# Patient Record
Sex: FEMALE | Race: WHITE | Employment: FULL TIME | URBAN - METROPOLITAN AREA
[De-identification: names, ages, dates, MRNs, and addresses within clinical notes are randomized per-mention and may not be internally consistent; named-entity substitution may affect disease eponyms.]

---

## 2020-03-24 ENCOUNTER — THERAPY VISIT (OUTPATIENT)
Dept: PHYSICAL THERAPY | Facility: CLINIC | Age: 45
End: 2020-03-24
Payer: OTHER MISCELLANEOUS

## 2020-03-24 DIAGNOSIS — M25.562 ACUTE PAIN OF LEFT KNEE: ICD-10-CM

## 2020-03-24 PROCEDURE — 97110 THERAPEUTIC EXERCISES: CPT | Mod: GP | Performed by: PHYSICAL THERAPIST

## 2020-03-24 PROCEDURE — 97140 MANUAL THERAPY 1/> REGIONS: CPT | Mod: GP | Performed by: PHYSICAL THERAPIST

## 2020-03-24 PROCEDURE — 97161 PT EVAL LOW COMPLEX 20 MIN: CPT | Mod: GP | Performed by: PHYSICAL THERAPIST

## 2020-03-24 ASSESSMENT — ACTIVITIES OF DAILY LIVING (ADL)
AS_A_RESULT_OF_YOUR_KNEE_INJURY,_HOW_WOULD_YOU_RATE_YOUR_CURRENT_LEVEL_OF_DAILY_ACTIVITY?: ABNORMAL
SWELLING: I DO NOT HAVE THE SYMPTOM
GIVING WAY, BUCKLING OR SHIFTING OF KNEE: I DO NOT HAVE THE SYMPTOM
HOW_WOULD_YOU_RATE_THE_OVERALL_FUNCTION_OF_YOUR_KNEE_DURING_YOUR_USUAL_DAILY_ACTIVITIES?: ABNORMAL
RAW_SCORE: 47
GO DOWN STAIRS: ACTIVITY IS VERY DIFFICULT
SIT WITH YOUR KNEE BENT: ACTIVITY IS NOT DIFFICULT
WALK: ACTIVITY IS MINIMALLY DIFFICULT
KNEE_ACTIVITY_OF_DAILY_LIVING_SCORE: 67.14
PAIN: THE SYMPTOM AFFECTS MY ACTIVITY SEVERELY
KNEEL ON THE FRONT OF YOUR KNEE: I AM UNABLE TO DO THE ACTIVITY
WEAKNESS: I DO NOT HAVE THE SYMPTOM
RISE FROM A CHAIR: ACTIVITY IS NOT DIFFICULT
GO UP STAIRS: ACTIVITY IS VERY DIFFICULT
SQUAT: ACTIVITY IS NOT DIFFICULT
STAND: ACTIVITY IS NOT DIFFICULT
LIMPING: THE SYMPTOM AFFECTS MY ACTIVITY MODERATELY
STIFFNESS: THE SYMPTOM AFFECTS MY ACTIVITY SLIGHTLY
KNEE_ACTIVITY_OF_DAILY_LIVING_SUM: 47

## 2020-03-24 NOTE — PROGRESS NOTES
"[facility] Initial Evaluation  Subjective:  The history is provided by the patient. No  was used.   Patient Health History  Chantale Brooks being seen for L knee pain.     Problem began: 3/16/2020.   Problem occurred: stepping up into box of truck   Pain is reported as 4/10 on pain scale.  General health as reported by patient is good.  Pertinent medical history includes: menopausal, migraines/headaches and overweight.   Red flags:  None as reported by patient.  Medical allergies: none.   Surgeries include:  Orthopedic surgery. Other surgery history details: L humerus, gastric bypass.    Current medications:  Anti-inflammatory.    Current occupation is .   Primary job tasks include:  Computer work, driving and lifting/carrying.                  Therapist Generated HPI Evaluation  Problem details: She was at work when she was at work on 3/16/20. She was standing on bottom step with L knee and then tried to step up in to the back of the truck.  She felt a sharp \"pop\" in L knee.  She could only put minimal weight on her L leg with standing and walking.  She had an MRI that showed a meniscus tear.  She was able to progressively put more weight through her knee over the next several days.  She has been off crutches for past 5 days.  Currently her pain is post L knee and up the back of her thigh to mid hamstring.  Some pain with doing squatting and using L knee on stairs, walking fast.  She has been avoiding kneeling and deep squatting. Feels better with rest. .         Type of problem:  Left knee.    This is a new condition.  Occurance: stepping up into bed of truck.  Where condition occurred: at work.  Patient reports pain:  Posterior.  Pain is described as aching and sharp and is intermittent.  Pain radiates to:  Thigh. Pain is the same all the time.  Since onset symptoms are gradually improving.  Associated symptoms:  Edema and catching. Symptoms are exacerbated by " kneeling, walking, ascending stairs, bending/squatting and descending stairs  and relieved by rest.  Special tests included:  MRI (meniscus tear).    Restrictions due to condition include:  Working in normal job with restrictions.  Barriers include:  Stairs.           Knee Activity of Daily Living Score: 67.14            Objective:  System    Physical Exam    General     ROS  Chantale Brooks , : 1975, MRN: [id number]    Physical Therapy Objective Findings  Subjective information, goals, clinical impression, daily documentation and other information found in EPISODES tab.  Knee Objective Findings    Posture:  Mild knee valgus and locked in hyperextension, mild increased ant pelvic tilt    Gait:  normal    Foot Position in Standing:  Pes planus    Lumbar Screen: lumbar ROM wnl,     Hip Screen: - scour, - SYEDA, - FADIR    Range of Motion:                                    Right AROM            Right PROM Left AROM              Left PROM                Extension/flexion 8-0-128 wnl 7-0-125 wnl   other    4+   Other:         Manual Muscle Testing  (graded 0-5, measured at 0 degrees unless otherwise noted):                                                                       Right                                                  Left                                                      Flexion 4+          4+   Extension 5 5   Hip Abduction 4 4-   Quad Set     VMO     Other: hip ER  Glue max 4-  4 4-  4-   (+ mild pain, ++ moderate pain, +++ severe pain)    Edema:                                                                        Right                                                  Left                                                        Baker's cyst         Special Tests:                                                                    Right                                                   Left                                                      Step Test Height           -Control Comment      Functional Squat (deg.) 75 deg 75 deg   Lachmans - -   Posterior Sag - -   Anterior Drawer - -   Posterior Drawer - -   Varus at 0 & 30 - -   Valgus at 0 & 30 - -   Barrett's - + for click   Apley's Distraction     Apley's Compression     External Rotation Test     OTHER: knee hyperflexion  Knee hyperextension  -  -     Flexibility:                                                                    Right                                                   Left                                                      Gastroc normal normal   Soleus     Hamstrings SLR 90 SLR 90   Hip Flexors     Quadricep normal normal   ITB Normal normal          Joint Mobility                                                                       Right                                                   Left                                                       Patellar Static Position normal normal   Patellar Passive Mobility normal normal   Patellar Dynamic Mobility Mild lateral tracking Mild lateral tracking   Proximal Tib-fib     Tibiofemoral normal normal          Palpation: baker's cyst L>R, tender L popliteus    Assessment/Plan:    Patient is a 44 year old female with left side knee complaints.    Patient has the following significant findings with corresponding treatment plan.                Diagnosis 1:  L knee pain consistent with mild meniscus irritation and post capsule pain from hyperextension stressing to knee    Pain -  hot/cold therapy, manual therapy, self management, education and home program  Decreased strength - therapeutic exercise, therapeutic activities and home program  Decreased function - therapeutic activities and home program  Impaired posture - neuro re-education, therapeutic activities and home program    Therapy Evaluation Codes:   1) History comprised of:   Personal factors that impact the plan of care:      Past/current experiences and Profession.    Comorbidity factors that impact the plan of care are:       Overweight.     Medications impacting care: None.  2) Examination of Body Systems comprised of:   Body structures and functions that impact the plan of care:      Knee.   Activity limitations that impact the plan of care are:      Running, Stairs, Standing, Walking and Working.  3) Clinical presentation characteristics are:   Stable/Uncomplicated.  4) Decision-Making    Low complexity using standardized patient assessment instrument and/or measureable assessment of functional outcome.  Cumulative Therapy Evaluation is: Low complexity.    Previous and current functional limitations:  (See Goal Flow Sheet for this information)    Short term and Long term goals: (See Goal Flow Sheet for this information)     Communication ability:  Patient appears to be able to clearly communicate and understand verbal and written communication and follow directions correctly.  Treatment Explanation - The following has been discussed with the patient:   RX ordered/plan of care  Anticipated outcomes  Possible risks and side effects  This patient would benefit from PT intervention to resume normal activities.   Rehab potential is good.    Frequency:  1 X week, once daily  Duration:  for 6 weeks  Discharge Plan:  Achieve all LTG.  Independent in home treatment program.  Reach maximal therapeutic benefit.    Please refer to the daily flowsheet for treatment today, total treatment time and time spent performing 1:1 timed codes.     Som Harvey,PT, DPT, OCS

## 2020-03-24 NOTE — LETTER
"[facility] PHYSICAL THERAPY  [address]  [facility] [id number] [phone]    2020    Re: Chantale Brooks   :   1975  MRN:  [id number]   REFERRING PHYSICIAN:   Kiran Lynch    [facility] PHYSICAL [facility]  Date of Initial Evaluation:  3/24/20  Visits:  Rxs Used: 1  Reason for Referral:  Acute pain of left knee    EVALUATION SUMMARY    [facility] Initial Evaluation  Subjective:  The history is provided by the patient. No  was used.   Patient Health History  Chantale Brooks being seen for L knee pain.   Problem began: 3/16/2020.   Problem occurred: stepping up into box of truck   Pain is reported as 4/10 on pain scale.  General health as reported by patient is good.  Pertinent medical history includes: menopausal, migraines/headaches and overweight.   Red flags:  None as reported by patient.  Medical allergies: none.   Surgeries include:  Orthopedic surgery. Other surgery history details: L humerus, gastric bypass.    Current medications:  Anti-inflammatory.    Current occupation is .   Primary job tasks include:  Computer work, driving and lifting/carrying.              Therapist Generated HPI Evaluation  Problem details: She was at work when she was at work on 3/16/20. She was standing on bottom step with L knee and then tried to step up in to the back of the truck.  She felt a sharp \"pop\" in L knee.  She could only put minimal weight on her L leg with standing and walking.  She had an MRI that showed a meniscus tear.  She was able to progressively put more weight through her knee over the next several days.  She has been off crutches for past 5 days.  Currently her pain is post L knee and up the back of her thigh to mid hamstring.  Some pain with doing squatting and using L knee on stairs, walking fast.  She has been avoiding kneeling and deep squatting. Feels " better with rest. .         Type of problem:  Left knee.  This is a new condition.  Occurance: stepping up into bed of truck.  Where condition occurred: at work.  Patient reports pain:  Posterior.  Pain is described as aching and sharp and is intermittent.  Pain radiates to:  Thigh. Pain is the same all the time.  Since onset symptoms are gradually improving.  Associated symptoms:  Edema and catching. Symptoms are exacerbated by kneeling, walking, ascending stairs, bending/squatting and descending stairs  and relieved by rest.  Special tests included:  MRI (meniscus tear).    Restrictions due to condition include:  Working in normal job with restrictions.  Barriers include:  Stairs.    Knee Activity of Daily Living Score: 67.14            Objective:   Cecilia , : 1975, MRN: [id number]    Physical Therapy Objective Findings  Subjective information, goals, clinical impression, daily documentation and other information found in EPISODES tab.  Knee Objective Findings    Posture:  Mild knee valgus and locked in hyperextension, mild increased ant pelvic tilt    Gait:  normal    Foot Position in Standing:  Pes planus    Lumbar Screen: lumbar ROM wnl,     Hip Screen: - scour, - SYEDA, - FADIR    Range of Motion:                                    Right AROM            Right PROM Left AROM              Left PROM                Extension/flexion 8-0-128 wnl 7-0-125 wnl   other    4+   Other:         Manual Muscle Testing  (graded 0-5, measured at 0 degrees unless otherwise noted):                                                                       Right                                                  Left                                                      Flexion 4+          4+   Extension 5 5   Hip Abduction 4 4-   Quad Set     VMO     Other: hip ER  Glue max 4-  4 4-  4-   (+ mild pain, ++ moderate pain, +++ severe pain)    Edema:                                                                        Right                                                   Left                                                        Baker's cyst         Special Tests:                                                                    Right                                                   Left                                                      Step Test Height           -Control Comment     Functional Squat (deg.) 75 deg 75 deg   Lachmans - -   Posterior Sag - -   Anterior Drawer - -   Posterior Drawer - -   Varus at 0 & 30 - -   Valgus at 0 & 30 - -   Barrett's - + for click   Apley's Distraction     Apley's Compression     External Rotation Test     OTHER: knee hyperflexion  Knee hyperextension  -  -     Flexibility:                                                                    Right                                                   Left                                                      Gastroc normal normal   Soleus     Hamstrings SLR 90 SLR 90   Hip Flexors     Quadricep normal normal   ITB Normal normal          Joint Mobility                                                                       Right                                                   Left                                                       Patellar Static Position normal normal   Patellar Passive Mobility normal normal   Patellar Dynamic Mobility Mild lateral tracking Mild lateral tracking   Proximal Tib-fib     Tibiofemoral normal normal          Palpation: baker's cyst L>R, tender L popliteus    Assessment/Plan:    Patient is a 44 year old female with left side knee complaints.    Patient has the following significant findings with corresponding treatment plan.                Diagnosis 1:  L knee pain consistent with mild meniscus irritation and post capsule pain from hyperextension stressing to knee  Pain -  hot/cold therapy, manual therapy, self management, education and home program  Decreased strength - therapeutic exercise, therapeutic activities  and home program  Decreased function - therapeutic activities and home program  Impaired posture - neuro re-education, therapeutic activities and home program    Therapy Evaluation Codes:   1) History comprised of:   Personal factors that impact the plan of care:      Past/current experiences and Profession.    Comorbidity factors that impact the plan of care are:      Overweight.     Medications impacting care: None.  2) Examination of Body Systems comprised of:   Body structures and functions that impact the plan of care:      Knee.   Activity limitations that impact the plan of care are:      Running, Stairs, Standing, Walking and Working.  3) Clinical presentation characteristics are:   Stable/Uncomplicated.  4) Decision-Making    Low complexity using standardized patient assessment instrument and/or measureable assessment of functional outcome.  Cumulative Therapy Evaluation is: Low complexity.    Previous and current functional limitations:  (See Goal Flow Sheet for this information)    Short term and Long term goals: (See Goal Flow Sheet for this information)     Communication ability:  Patient appears to be able to clearly communicate and understand verbal and written communication and follow directions correctly.  Treatment Explanation - The following has been discussed with the patient:   RX ordered/plan of care  Anticipated outcomes  Possible risks and side effects  This patient would benefit from PT intervention to resume normal activities.   Rehab potential is good.    Frequency:  1 X week, once daily  Duration:  for 6 weeks  Discharge Plan:  Achieve all LTG.  Independent in home treatment program.  Reach maximal therapeutic benefit.      Thank you for your referral.    INQUIRIES  Therapist: Som Harvey,PT, DPT, \[facility]\""    [facility]  [address] [place], [place] [address]  [facility] [address]  Phone: [phone]  Fax: [phone]

## 2020-04-07 ENCOUNTER — VIRTUAL VISIT (OUTPATIENT)
Dept: PHYSICAL THERAPY | Facility: CLINIC | Age: 45
End: 2020-04-07
Payer: OTHER MISCELLANEOUS

## 2020-04-07 DIAGNOSIS — M25.562 ACUTE PAIN OF LEFT KNEE: ICD-10-CM

## 2020-04-07 PROCEDURE — 97110 THERAPEUTIC EXERCISES: CPT | Mod: 95 | Performed by: PHYSICAL THERAPIST

## 2020-04-07 PROCEDURE — 97112 NEUROMUSCULAR REEDUCATION: CPT | Mod: 95 | Performed by: PHYSICAL THERAPIST

## 2020-04-07 NOTE — PROGRESS NOTES
"Physical Therapy Virtual Follow Up Visit      The patient has been notified of following:     \"This virtual visit will be conducted between you and your provider. We have found that certain health care needs can be provided without the need for physical presence.  This service lets us provide the care you need with a virtual visit.\"    Due to external, as well as internal [facility] management of the COVID-19 Virus, Chantale Brooks was not seen in our clinic.  As a substitution, we implemented a virtual visit to manage this patient's condition utilizing the PTRx virtual visit platform via the patient s existing code.  The provider, Som Harvey, reviewed the patient's chart, PTRx prescription, and spoke with the patient to determine the following telemedicine visit is appropriate and effective for the patient's care.    The following type of visit was completed:   Video Visit:  The PTRx platform uses a synchronous HIPAA compliant video stream for this patient encounter.        S: Chantale Brooks is a 44 year old female. Connected virtually on the PTRx platform to discuss their condition/progress. They noted improvements in walking and standing tolerance.  Reciprocal pattern up stairs. Exercises are going well.  They noted ongoing pain or limitations with she will have some pain with standing with knee locked.   Domenico time pattern going down stairs.  Current pain level: 0/10  Worst 4/10    O: Patient demonstrated mild pain with medial collapse with single limb squat L>R, no medial collapse of knees with squat      PTRx Content from today's visit:  TE:  Reviewed bridging #1, s/l hip abd, s/l hip ER  Added:  Bridging #2a x 15  Supine abd 3B x 15  Instructed pt to add 1-2# to leg for s/l hip abd and ER    NMR:   SLS with dead lift (one hand on counter) x 10 B  SLS with heal/toe taps for mini single limb squat forward/backwards x 10 each B  Squatting with holding stick overhead to promote equal hip and thigh " coordination x 15        A:   Patient is progressing as expected.  Response to therapy has shown an improvement in  pain level, muscle control and function  Pt has much improved control of knee and avoiding knee hyperextension.    P: Patient will continue with the exercise program assigned on their PTRx code and will add the following measures to manage their pain/condition: see updated PTRx exercises.  Will progress strength/control as able to help patient avoid terminal knee ext in standing.     Current treatment program is being advanced to more complex exercises.      Virtual visit contact time    Time of service began: 4:05 PM  Time of service ended: 4:35 PM  Total Time for set up, visit, and documentation: 40 minutes    Payor: WORK COMP / Plan: WC OTHER / Product Type: *No Product type* /   .  Procedure Code/s   Therapeutic Exercise (38142): 15 minutes  Neuromuscular Re-education (821600): 15 minutes    I have reviewed the note as documented above.  This accurately captures the substance of my conversation with the patient.  Provider location: [place], [place] ([facility])  Patient location: home    Som Harvey,PT, DPT, OCS    ___________________________________________________    Any subjective info about the quality of the visit, ease of use: pt was able to see PT and hear PT, PT couldn't see patient, but could hear them, pt was able to see herself on her computer  Patient's opinion about reasonable cost for this visit

## 2020-04-14 ENCOUNTER — VIRTUAL VISIT (OUTPATIENT)
Dept: PHYSICAL THERAPY | Facility: CLINIC | Age: 45
End: 2020-04-14
Payer: OTHER MISCELLANEOUS

## 2020-04-14 DIAGNOSIS — M25.562 ACUTE PAIN OF LEFT KNEE: ICD-10-CM

## 2020-04-14 PROCEDURE — 97110 THERAPEUTIC EXERCISES: CPT | Mod: 95 | Performed by: PHYSICAL THERAPIST

## 2020-04-14 PROCEDURE — 97112 NEUROMUSCULAR REEDUCATION: CPT | Mod: 95 | Performed by: PHYSICAL THERAPIST

## 2020-04-14 NOTE — PROGRESS NOTES
"Physical Therapy Virtual Follow Up Visit      The patient has been notified of following:     \"This virtual visit will be conducted between you and your provider. We have found that certain health care needs can be provided without the need for physical presence.  This service lets us provide the care you need with a virtual visit.\"    Due to external, as well as internal [facility] management of the COVID-19 Virus, Chantale Brooks was not seen in our clinic.  As a substitution, we implemented a virtual visit to manage this patient's condition utilizing the iPlingx virtual visit platform via the patient s existing code.  The provider, Som Harvey, reviewed the patient's chart, PTRx prescription, and spoke with the patient to determine the following telemedicine visit is appropriate and effective for the patient's care.    The following type of visit was completed:   Video Visit:  The iPlingx platform uses a synchronous HIPAA compliant video stream for this patient encounter.        S: Chantale Brooks is a 44 year old female. Connected virtually on the iPlingx platform to discuss their condition/progress. She has had a set back with doing single limb exercises.  The pain started a couple of hours after her last appt.  The bakers cyst has got worse in  The last week.  She can go up stairs in reciprocal pattern.  She is doing jayson time pattern down stairs.  They noted improvements in muscle control.  They noted ongoing pain or limitations with swelling and pain.     Current pain level: 3/10  Worst 6/10    O: Patient demonstrated   Bakers cyst L knee, repeated knee ext in sitting: lessening pain posterior knee   Tandem stance eyes closed: L foot forward x 11 sec, R foot forward: 60 sec    PTRx Content from today's visit:  TE:  Bridge 2A x 15  Bridge 2B x15  Supine abd 3B x 15  Supine abd 7 x 10  S/l hip abd (wearing boots) x 20 B    NMR:  SLS with opposite leg hip flex/abd/ext AG x 10 each B   Standing stance eyes " closed: x1min work B  Mini squat position with side stepping x 30 B  Tandem gait with finger on counter 2x8'             A:   Patient has experienced an exacerbation of symptoms.  She had a new irritation of her meniscus with step exercises.  Response to therapy has shown an improvement in  muscle control  Response to therapy has shown a worsening of  pain level, edema and function      P: Patient will continue with the exercise program assigned on their PTRx code and will add the following measures to manage their pain/condition: eliminated step exercises, progressed core and balance exercises to promote improved coordination of pelvic stabilizers and knee control     Continue current treatment plan until patient demonstrates readiness to progress to higher level exercises.      Virtual visit contact time    Time of service began: 3:52 PM  Time of service ended: 4:28 PM  Total Time for set up, visit, and documentation: 45 minutes    Payor: WORK COMP / Plan: WC OTHER / Product Type: *No Product type* /   .  Procedure Code/s   Therapeutic Exercise (16400): 20 minutes  Neuromuscular Re-education (72609): 16 minutes    I have reviewed the note as documented above.  This accurately captures the substance of my conversation with the patient.  Provider location: [place], [place] ([facility])  Patient location: home    Som Harvey,PT, DPT, OCS

## 2020-04-21 ENCOUNTER — VIRTUAL VISIT (OUTPATIENT)
Dept: PHYSICAL THERAPY | Facility: CLINIC | Age: 45
End: 2020-04-21
Payer: OTHER MISCELLANEOUS

## 2020-04-21 DIAGNOSIS — M25.562 ACUTE PAIN OF LEFT KNEE: ICD-10-CM

## 2020-04-21 PROCEDURE — 97110 THERAPEUTIC EXERCISES: CPT | Mod: 95 | Performed by: PHYSICAL THERAPIST

## 2020-04-21 PROCEDURE — 97112 NEUROMUSCULAR REEDUCATION: CPT | Mod: 95 | Performed by: PHYSICAL THERAPIST

## 2020-04-21 NOTE — LETTER
"[facility] ATHLETIC [facility] PHYSICAL THERAPY  [address]  [facility] [id number] [phone]    2020    Re: Chantale Brooks   :   1975  MRN:  [id number]   REFERRING PHYSICIAN:   Kiran Lynch    [facility] ATHLETIC [facility] PHYSICAL [facility]  Date of Initial Evaluation:  2020  Visits:  Rxs Used: 3  Reason for Referral:  Acute pain of left knee    Physical Therapy Progress Note   Virtual Follow Up Visit    The patient has been notified of following:     \"This virtual visit will be conducted between you and your provider. We have found that certain health care needs can be provided without the need for physical presence.  This service lets us provide the care you need with a virtual visit.\"    Due to external, as well as internal [facility] management of the COVID-19 Virus, Chantale Brooks was not seen in our clinic.  As a substitution, we implemented a virtual visit to manage this patient's condition utilizing the [employer] virtual visit platform via the patient s existing code.  The provider, Som Harvey, reviewed the patient's chart, PTRx prescription, and spoke with the patient to determine the following telemedicine visit is appropriate and effective for the patient's care.    The following type of visit was completed:   Video Visit:  The [employer] platform uses a synchronous HIPAA compliant video stream for this patient encounter.      S: Chantale Brooks is a 44 year old female. Connected virtually on the [employer] platform to discuss their condition/progress. She had a fall on Saturday where her flip flop got caught and she felt forward onto her hands and knees.  Now she has a good bruise in her L knee.  She has been able to get back to riding her motorcycle.  She is now able to walk stairs normal if going at 50% speed. No problem with getting in/out of work vehicle.  No problems with going up/down curbs.  They noted improvements in stairs, " squatting, walking.  They noted ongoing pain or limitations with mild achy if she over does her activity.     Current pain level: 0/10  Worst: 3-4/10    O: Patient demonstrated   Ecchymosis in lateral L knee from fall  No significant swelling behind knee  Equal AROM B knees  30 sec sit<>stand: 16 reps  Tandem stance eyes closed: R foot forward x 1min, L foot forward x 1 min  SLS eyes closed: R x1min with significant sway, L 4 sec    PTRx Content from today's visit:  TE:   Sit<>stand x 16  bridge #1 x15  Bridge #2a  2x15  Bridge #2b - trialed but unable to keep pelvis level  Supine abd #3b x 15  Crunch with arms out straight x 15  S/l hip abd AG x 30 B  Toe raise (double limb) x 20    NMR:   SLS with toe taps forward x 20 B  Parallel stance eyes closed x 1min   Tandem stance eyes closed x 1min B  SLS eyes closed: R 1 min, L 1 min with one hand on counter    ASSESSMENT/PLAN  Updated problem list and treatment plan: Diagnosis 1: L knee pain consistent with mild meniscus irritation and post capsule pain from hyperextension stressing to knee    Pain -  hot/cold therapy, self management, education, directional preference exercise and home program  Impaired balance - neuro re-education, therapeutic activities and home program  Decreased function - therapeutic activities and home program  STG/LTGs have been met or progress has been made towards goals:  Yes (See Goal flow sheet completed today.)  Assessment of Progress: The patient's condition is improving.  Self Management Plans:  Patient has been instructed in a home treatment program.  I have re-evaluated this patient and find that the nature, scope, duration and intensity of the therapy is appropriate for the medical condition of the patient.  Chantale continues to require the following intervention to meet STG and LTG's:  PT    Recommendations:  Patient will continue with the exercise program assigned on their PTRx code.  This patient would benefit from continued therapy.      Frequency:  1 X week, once daily  Duration:  for 3 weeks    Virtual visit contact time    Time of service began: 3:50 PM  Time of service ended: 4:28 PM  Total Time for set up, visit, and documentation: 45 minutes  Payor: WORK COMP / Plan: WC OTHER / Product Type: *No Product type* /   Procedure Code/s   Therapeutic Exercise (02990): 23 minutes  Neuromuscular Re-education (96881): 15 minutes    I have reviewed the note as documented above.  This accurately captures the substance of my conversation with the patient.  Provider location: [place], [place] ([facility])  Patient location: home    Thank you for your referral.    INQUIRIES  Therapist:Som Harvey, PT, DPT, [facility]  [facility]  [address] [place] [address] [address]  [facility] [address]  Phone: [phone]  Fax: [phone]

## 2020-04-21 NOTE — PROGRESS NOTES
"Physical Therapy Progress Note   Virtual Follow Up Visit      The patient has been notified of following:     \"This virtual visit will be conducted between you and your provider. We have found that certain health care needs can be provided without the need for physical presence.  This service lets us provide the care you need with a virtual visit.\"    Due to external, as well as internal [facility] management of the COVID-19 Virus, Chantale Brooks was not seen in our clinic.  As a substitution, we implemented a virtual visit to manage this patient's condition utilizing the [employer] virtual visit platform via the patient s existing code.  The provider, Som Harvey, reviewed the patient's chart, PTRx prescription, and spoke with the patient to determine the following telemedicine visit is appropriate and effective for the patient's care.    The following type of visit was completed:   Video Visit:  The [employer] platform uses a synchronous HIPAA compliant video stream for this patient encounter.        S: Chantale Brooks is a 44 year old female. Connected virtually on the [employer] platform to discuss their condition/progress. She had a fall on Saturday where her flip flop got caught and she felt forward onto her hands and knees.  Now she has a good bruise in her L knee.  She has been able to get back to riding her motorcycle.  She is now able to walk stairs normal if going at 50% speed. No problem with getting in/out of work vehicle.  No problems with going up/down curbs.  They noted improvements in stairs, squatting, walking.  They noted ongoing pain or limitations with mild achy if she over does her activity.     Current pain level: 0/10  Worst: 3-4/10    O: Patient demonstrated   Ecchymosis in lateral L knee from fall  No significant swelling behind knee  Equal AROM B knees  30 sec sit<>stand: 16 reps  Tandem stance eyes closed: R foot forward x 1min, L foot forward x 1 min  SLS eyes closed: R x1min with significant " sway, L 4 sec    PTRx Content from today's visit:  TE:   Sit<>stand x 16  bridge #1 x15  Bridge #2a  2x15  Bridge #2b - trialed but unable to keep pelvis level  Supine abd #3b x 15  Crunch with arms out straight x 15  S/l hip abd AG x 30 B  Toe raise (double limb) x 20    NMR:   SLS with toe taps forward x 20 B  Parallel stance eyes closed x 1min   Tandem stance eyes closed x 1min B  SLS eyes closed: R 1 min, L 1 min with one hand on counter        ASSESSMENT/PLAN  Updated problem list and treatment plan: Diagnosis 1: L knee pain consistent with mild meniscus irritation and post capsule pain from hyperextension stressing to knee    Pain -  hot/cold therapy, self management, education, directional preference exercise and home program  Impaired balance - neuro re-education, therapeutic activities and home program  Decreased function - therapeutic activities and home program  STG/LTGs have been met or progress has been made towards goals:  Yes (See Goal flow sheet completed today.)  Assessment of Progress: The patient's condition is improving.  Self Management Plans:  Patient has been instructed in a home treatment program.  I have re-evaluated this patient and find that the nature, scope, duration and intensity of the therapy is appropriate for the medical condition of the patient.  Chantale continues to require the following intervention to meet STG and LTG's:  PT    Recommendations:  Patient will continue with the exercise program assigned on their PTRx code.  This patient would benefit from continued therapy.     Frequency:  1 X week, once daily  Duration:  for 3 weeks          Virtual visit contact time    Time of service began: 3:50 PM  Time of service ended: 4:28 PM  Total Time for set up, visit, and documentation: 45 minutes    Payor: WORK COMP / Plan: WC OTHER / Product Type: *No Product type* /   .  Procedure Code/s   Therapeutic Exercise (96845): 23 minutes  Neuromuscular Re-education (20626): 15 minutes    I  have reviewed the note as documented above.  This accurately captures the substance of my conversation with the patient.  Provider location: [place], [place] ([facility])  Patient location: home    Som HarveyPT, DPT, OCS

## 2020-04-28 ENCOUNTER — VIRTUAL VISIT (OUTPATIENT)
Dept: PHYSICAL THERAPY | Facility: CLINIC | Age: 45
End: 2020-04-28
Payer: OTHER MISCELLANEOUS

## 2020-04-28 DIAGNOSIS — M25.562 ACUTE PAIN OF LEFT KNEE: ICD-10-CM

## 2020-04-28 PROCEDURE — 97110 THERAPEUTIC EXERCISES: CPT | Mod: 95 | Performed by: PHYSICAL THERAPIST

## 2020-04-28 PROCEDURE — 97112 NEUROMUSCULAR REEDUCATION: CPT | Mod: 95 | Performed by: PHYSICAL THERAPIST

## 2020-04-28 NOTE — PROGRESS NOTES
"DISCHARGE REPORT    Physical Therapy Virtual Follow Up Visit      Progress reporting period is from 4/21/20 to 4/28/20.       The patient has been notified of following:     \"This virtual visit will be conducted between you and your provider. We have found that certain health care needs can be provided without the need for physical presence.  This service lets us provide the care you need with a virtual visit.\"    Due to external, as well as internal [facility] management of the COVID-19 Virus, Chantale Brooks was not seen in our clinic.  As a substitution, we implemented a virtual visit to manage this patient's condition utilizing the PTRx virtual visit platform via the patient s existing code.  The provider, Som Harvey, reviewed the patient's chart, PTRx prescription, and spoke with the patient to determine the following telemedicine visit is appropriate and effective for the patient's care.    The following type of visit was completed:   Video Visit:  The [employer] platform uses a synchronous HIPAA compliant video stream for this patient encounter.      SUBJECTIVE  Subjective changes noted by patient:  Chantale Brooks is a 44 year old female. Connected virtually on the PTRx platform to discuss their condition/progress. She was able to talk with the MD last week.  She notes that the MD is really happy with her progress.  She is ~80% better.  She was able to work in garden for several hours with some swelling and mild pain.  She is back to normal speed for stairs.  They noted improvements in stairs and squatting.  They noted ongoing pain or limitations with prolonged kneeling.       Current pain level is 1/10  .     Previous pain level was  0/10  .   Changes in function:  Yes (See Goal flowsheet attached for changes in current functional level)  Adverse reaction to treatment or activity: None    OBJECTIVE  Changes noted in objective findings:    Mild baker's cyst    Equal AROM B knees  30 sec sit<>stand: 17 " reps  SLS eyes closed: R x1min with mild sway, L 60 sec with mild sway    PTRx Content from today's visit:  TE:  Sit<>stand x 17  Bridge 2B: x15  Reviewed HEP  Discussed how to progress exercises and how to add weights to exercises to make them more challenging    NMR:  SLS eyes closed x 1min B  SLS with heel taps forward x 10 B  SLS with opposite leg hip flex/abd/ext wearing steel toe boots x 15 each B    Self:  Discussed use of knee pads and knee savers to protect her knees when she is in a deep kneeling position x 3 min      ASSESSMENT/PLAN  Updated problem list and treatment plan: Diagnosis 1:  knee pain consistent with mild meniscus irritation and post capsule pain from hyperextension stressing to knee    Pain -  home program  STG/LTGs have been met or progress has been made towards goals:  Yes (See Goal flow sheet completed today.)  Assessment of Progress: The patient has met all of their long term goals.  Self Management Plans:  Patient has been instructed in a home treatment program.  I have re-evaluated this patient and find that the nature, scope, duration and intensity of the therapy is appropriate for the medical condition of the patient.  Chantale continues to require the following intervention to meet STG and LTG's:  PT intervention is no longer required to meet STG/LTG.    Recommendations:  Patient will continue with the exercise program assigned on their PTRx code.  This patient is ready to be discharged from therapy and continue their home treatment program.      Virtual visit contact time    Time of service began: 3:50 PM  Time of service ended: 4:18 PM  Total Time for set up, visit, and documentation: 35 minutes    Payor: WORK COMP / Plan: WC OTHER / Product Type: *No Product type* /     Procedure Code/s   Therapeutic Exercise (80935): 13 minutes  Neuromuscular Re-education (70375): 13 minutes  Self Care / Home Management Training (22659): 2 minutes    I have reviewed the note as documented above.   This accurately captures the substance of my conversation with the patient.  Provider location: [place] ([facility])  Patient location: home      Som Harvey,PT, DPT, OCS

## 2020-04-28 NOTE — LETTER
"[facility] ATHLETIC [facility] PHYSICAL THERAPY  [address]  [facility] [id number] [phone]    2020    Re: Chantale Brooks   :   1975  MRN:  [id number]   REFERRING PHYSICIAN:   Kiran Lynch    [facility]  Date of Initial Evaluation:  3/24/2020  Visits:  Rxs Used: 4  Reason for Referral:  Acute pain of left knee    EVALUATION SUMMARY    DISCHARGE REPORT    Physical Therapy Virtual Follow Up Visit  Progress reporting period is from 20 to 20.       The patient has been notified of following:     \"This virtual visit will be conducted between you and your provider. We have found that certain health care needs can be provided without the need for physical presence.  This service lets us provide the care you need with a virtual visit.\"    Due to external, as well as internal [facility] management of the COVID-19 Virus, Chantale Brooks was not seen in our clinic.  As a substitution, we implemented a virtual visit to manage this patient's condition utilizing the [employer] virtual visit platform via the patient s existing code.  The provider, Som Harvey, reviewed the patient's chart, PTRx prescription, and spoke with the patient to determine the following telemedicine visit is appropriate and effective for the patient's care.    The following type of visit was completed:   Video Visit:  The [employer] platform uses a synchronous HIPAA compliant video stream for this patient encounter.      SUBJECTIVE  Subjective changes noted by patient:  Chantale Brooks is a 44 year old female. Connected virtually on the [employer] platform to discuss their condition/progress. She was able to talk with the MD last week.  She notes that the MD is really happy with her progress.  She is ~80% better.  She was able to work in garden for several hours with some swelling and mild pain.  She is back to normal speed for stairs.  They noted " improvements in stairs and squatting.  They noted ongoing pain or limitations with prolonged kneeling.       Current pain level is 1/10  .     Previous pain level was  0/10  .   Changes in function:  Yes (See Goal flowsheet attached for changes in current functional level)  Adverse reaction to treatment or activity: None    OBJECTIVE  Changes noted in objective findings:    Mild baker's cyst    Equal AROM B knees  30 sec sit<>stand: 17 reps  SLS eyes closed: R x1min with mild sway, L 60 sec with mild sway    PTRx Content from today's visit:  TE:  Sit<>stand x 17  Bridge 2B: x15  Reviewed HEP  Discussed how to progress exercises and how to add weights to exercises to make them more challenging    NMR:  SLS eyes closed x 1min B  SLS with heel taps forward x 10 B  SLS with opposite leg hip flex/abd/ext wearing steel toe boots x 15 each B    Self:  Discussed use of knee pads and knee savers to protect her knees when she is in a deep kneeling position x 3 min    ASSESSMENT/PLAN  Updated problem list and treatment plan: Diagnosis 1:  knee pain consistent with mild meniscus irritation and post capsule pain from hyperextension stressing to knee    Pain -  home program  STG/LTGs have been met or progress has been made towards goals:  Yes (See Goal flow sheet completed today.)  Assessment of Progress: The patient has met all of their long term goals.  Self Management Plans:  Patient has been instructed in a home treatment program.  I have re-evaluated this patient and find that the nature, scope, duration and intensity of the therapy is appropriate for the medical condition of the patient.  Chantale continues to require the following intervention to meet STG and LTG's:  PT intervention is no longer required to meet STG/LTG.    Recommendations:  Patient will continue with the exercise program assigned on their PTRx code.  This patient is ready to be discharged from therapy and continue their home treatment program.    Virtual  visit contact time    Time of service began: 3:50 PM  Time of service ended: 4:18 PM  Total Time for set up, visit, and documentation: 35 minutes    Payor: WORK COMP / Plan: WC OTHER / Product Type: *No Product type* /   Procedure Code/s   Therapeutic Exercise (73546): 13 minutes  Neuromuscular Re-education (50629): 13 minutes  Self Care / Home Management Training (83117): 2 minutes    I have reviewed the note as documented above.  This accurately captures the substance of my conversation with the patient.  Provider location: [facility] ([facility])  Patient location: home      Thank you for your referral.    INQUIRIES  Therapist: Som Harvey,PT, DPT, [facility]      [facility] [place] [facility]  [address] [place], [place] [address]  [facility] [address]  Phone: [phone]  Fax: [phone]